# Patient Record
Sex: FEMALE | Race: WHITE | Employment: UNEMPLOYED | URBAN - METROPOLITAN AREA
[De-identification: names, ages, dates, MRNs, and addresses within clinical notes are randomized per-mention and may not be internally consistent; named-entity substitution may affect disease eponyms.]

---

## 2021-04-12 ENCOUNTER — TRANSCRIBE ORDER (OUTPATIENT)
Dept: SCHEDULING | Age: 71
End: 2021-04-12

## 2021-04-12 DIAGNOSIS — R20.1 HYPOESTHESIA: ICD-10-CM

## 2021-04-12 DIAGNOSIS — M54.32 LEFT SIDED SCIATICA: Primary | ICD-10-CM

## 2021-04-12 DIAGNOSIS — M47.816 LUMBAR SPONDYLOSIS: ICD-10-CM

## 2021-04-22 ENCOUNTER — HOSPITAL ENCOUNTER (OUTPATIENT)
Dept: MRI IMAGING | Age: 71
Discharge: HOME | End: 2021-04-22
Payer: MEDICARE

## 2021-04-22 DIAGNOSIS — M47.816 LUMBAR SPONDYLOSIS: ICD-10-CM

## 2021-04-22 DIAGNOSIS — R20.1 HYPOESTHESIA: ICD-10-CM

## 2021-04-22 DIAGNOSIS — M54.32 LEFT SIDED SCIATICA: ICD-10-CM

## 2021-04-22 PROCEDURE — 72148 MRI LUMBAR SPINE W/O DYE: CPT | Performed by: PHYSICAL MEDICINE & REHABILITATION

## 2025-06-09 ENCOUNTER — OFFICE VISIT (OUTPATIENT)
Age: 75
End: 2025-06-09
Payer: MEDICARE

## 2025-06-09 VITALS
SYSTOLIC BLOOD PRESSURE: 122 MMHG | OXYGEN SATURATION: 97 % | BODY MASS INDEX: 21.97 KG/M2 | HEIGHT: 67 IN | WEIGHT: 140 LBS | HEART RATE: 88 BPM | DIASTOLIC BLOOD PRESSURE: 60 MMHG

## 2025-06-09 DIAGNOSIS — R41.3 MEMORY CHANGES: Primary | ICD-10-CM

## 2025-06-09 DIAGNOSIS — F03.B0 MODERATE DEMENTIA WITHOUT BEHAVIORAL DISTURBANCE, PSYCHOTIC DISTURBANCE, MOOD DISTURBANCE, OR ANXIETY, UNSPECIFIED DEMENTIA TYPE (HCC): Primary | ICD-10-CM

## 2025-06-09 PROCEDURE — 1126F AMNT PAIN NOTED NONE PRSNT: CPT | Performed by: PSYCHIATRY & NEUROLOGY

## 2025-06-09 PROCEDURE — 3017F COLORECTAL CA SCREEN DOC REV: CPT | Performed by: PSYCHIATRY & NEUROLOGY

## 2025-06-09 PROCEDURE — 99204 OFFICE O/P NEW MOD 45 MIN: CPT | Performed by: PSYCHIATRY & NEUROLOGY

## 2025-06-09 PROCEDURE — 96132 NRPSYC TST EVAL PHYS/QHP 1ST: CPT | Performed by: PSYCHIATRY & NEUROLOGY

## 2025-06-09 PROCEDURE — 96136 PSYCL/NRPSYC TST PHY/QHP 1ST: CPT | Performed by: PSYCHIATRY & NEUROLOGY

## 2025-06-09 PROCEDURE — G8400 PT W/DXA NO RESULTS DOC: HCPCS | Performed by: PSYCHIATRY & NEUROLOGY

## 2025-06-09 PROCEDURE — 4004F PT TOBACCO SCREEN RCVD TLK: CPT | Performed by: PSYCHIATRY & NEUROLOGY

## 2025-06-09 PROCEDURE — 1090F PRES/ABSN URINE INCON ASSESS: CPT | Performed by: PSYCHIATRY & NEUROLOGY

## 2025-06-09 PROCEDURE — 1123F ACP DISCUSS/DSCN MKR DOCD: CPT | Performed by: PSYCHIATRY & NEUROLOGY

## 2025-06-09 PROCEDURE — 1159F MED LIST DOCD IN RCRD: CPT | Performed by: PSYCHIATRY & NEUROLOGY

## 2025-06-09 PROCEDURE — G8427 DOCREV CUR MEDS BY ELIG CLIN: HCPCS | Performed by: PSYCHIATRY & NEUROLOGY

## 2025-06-09 PROCEDURE — G8420 CALC BMI NORM PARAMETERS: HCPCS | Performed by: PSYCHIATRY & NEUROLOGY

## 2025-06-09 RX ORDER — PRAVASTATIN SODIUM 20 MG
20 TABLET ORAL DAILY
COMMUNITY
Start: 2025-04-17

## 2025-06-09 RX ORDER — PREDNISONE 5 MG/1
TABLET ORAL
COMMUNITY
Start: 2025-03-23

## 2025-06-09 RX ORDER — IRBESARTAN AND HYDROCHLOROTHIAZIDE 150; 12.5 MG/1; MG/1
1 TABLET, FILM COATED ORAL DAILY
COMMUNITY
Start: 2025-04-17

## 2025-06-09 NOTE — PROCEDURES
Neurocognitive Test Administration    Patient Information:  DANIEL GONG  1950  Female  This 74 year old female was administered a battery of neurocognitive testing on 06/09/2025.    Reason for Testing:  Memory changes    Test Administration Time:  The time spent administering cognitive testing was 16 minutes including setting the patient up, creating the order, discussing cognitive testing, answering questions, administering the test / active testing time, ensuring best practices (i.e. no distractions), and uploading the results. This cognitive testing was administered by a technician.    Neurocognitive Test Interpretation    Reason for Testing:  Memory changes    Tests Administered:  Trails A, Trails B, Stroop, Digit Symbol Substitution, Immediate Recognition, Delayed Recognition    Test Results:  Cognitive testing was provided via a battery of cognitive assessments. The pattern of test scores indicate that results are valid.    A Clinical Report with further description of scores and results is also available.    Overall: Patient tested in the 1st percentile (scaled standard score of 60).    Trails A: Patient tested in the 62nd percentile (scaled standard score of 104).  Trails B: Unavailable*.  Stroop: Patient tested in the 75th percentile (scaled standard score of 110).  Digit Symbol Substitution: Patient tested in the 8th percentile (scaled standard score of 79).  Immediate Recognition: Patient tested in the 1st percentile (scaled standard score of 45).  Delayed Recognition: Patient tested in the 1st percentile (scaled standard score of 20).    * These were not scored as they were potentially invalid, or the patient failed to complete in allotted time.    Interpretation of Test Scores:  Examination of individual component tests shows:  Attention - Trails A: Unlikely Impairment  Mental Flexibility - Trails B: Possible Impairment  Executive Function - Stroop: Unlikely Impairment  Processing Speed - Digit

## 2025-06-09 NOTE — PROGRESS NOTES
NEUROLOGY  NEW PATIENT EVALUATION/CONSULTATION       PATIENT NAME: Lorri Garcia    MRN: [id number]    REASON FOR CONSULTATION: Memory impairment    06/09/25      Previous records (physician notes, laboratory reports, and radiology reports) and imaging studies were reviewed and summarized. My recommendations will be communicated back to the patient's physician(s) via electronic medical record and/or by US mail. The patient was accompanied by her .      HISTORY OF PRESENT ILLNESS:  Lorri Garcia is a 74 y.o.  female presenting for evaluation of memory impairment.      Onset and progression: Worse over the past 2 years but reports some degree of memory impairment \"all my life\"    Neuropsychiatric symptoms    By Family members account:    Problems with judgment:No   Reduced interest in hobbies/activities: No   Repeats questions, stories, or statements: Yes    Trouble recalling people's names: No   Trouble learning how to use a tool or appliance: No   Forgetting the correct month or year: Yes   Difficulty handling financial affairs (bill-paying, taxes): Yes   Difficulty remembering appointments: No    Memory: Short term recall per pt's   Language:No word finding difficulty   Change in personality: None  Socially inappropriate behavior:None  Change in eating habits:None  Physical changes:None  Depressive symptoms:None  Hallucinations/Delusions:None    Ability to function:  Driving:Yes, no difficulties reported  Finances: Manages this independently with minor difficulties  Manages own medication: Managed by patient, no difficulties reported   Residing: At home with her , single family home    Prior work-up: None    Prior treatments: None      PAST MEDICAL HISTORY:  Past Medical History:   Diagnosis Date    H/O hypercholesterolemia     Hypertension     Memory disorder        PAST SURGICAL HISTORY:  History reviewed. No pertinent surgical history.    FAMILY HISTORY:   Family History   Problem

## 2025-06-10 LAB
TSH SERPL DL<=0.005 MIU/L-ACNC: 1.06 UIU/ML (ref 0.45–4.5)
VIT B12 SERPL-MCNC: 248 PG/ML (ref 232–1245)

## 2025-06-11 ENCOUNTER — RESULTS FOLLOW-UP (OUTPATIENT)
Age: 75
End: 2025-06-11

## 2025-06-11 NOTE — TELEPHONE ENCOUNTER
----- Message from Dr. Rocio Mckeon DO sent at 6/10/2025 11:13 AM EDT -----  B12 levels are borderline low normal range-advise starting B12 supplement 500mcg/daily. RMD  ----- Message -----  From: Kemi Beck Incoming Amb Ref Lab Orders To [facility]  Sent: 6/10/2025   7:36 AM EDT  To: Rocio Mckeon DO

## 2025-06-12 ENCOUNTER — TELEPHONE (OUTPATIENT)
Age: 75
End: 2025-06-12

## 2025-06-12 NOTE — TELEPHONE ENCOUNTER
Patient called in needing assistance signing up for [employer].    PSR sent her a new activation link and advised her to call back should she have any issues.

## 2025-06-12 NOTE — TELEPHONE ENCOUNTER
Called patient. BC was done during the appt. Might of been a mistake. Stated to ignore it please.  
Patient would like a call back as she states she cannot access the link for a brain check that the nurse sent her.  
Negative

## 2025-06-13 ENCOUNTER — TELEPHONE (OUTPATIENT)
Age: 75
End: 2025-06-13

## 2025-06-13 NOTE — TELEPHONE ENCOUNTER
Patient would like to confirm what medications Dr. Mckeon would like her to take until she sees her again in January.    Patient states she believes it is b-12.    Please call back and advise.

## 2025-07-21 ENCOUNTER — TELEPHONE (OUTPATIENT)
Age: 75
End: 2025-07-21

## 2025-07-21 NOTE — TELEPHONE ENCOUNTER
Pt  requesting call back to discuss pt condition and how to cope until she is able to be evaluated by neuropsych

## 2025-07-22 NOTE — TELEPHONE ENCOUNTER
Returned the call to the Pt's  however had to leave a voice mail asking him to return the call or send a message via my chart. Whichever is easiest for him.        Pt  requesting call back to discuss pt condition and how to cope until she is able to be evaluated by neuropsych

## 2025-07-31 NOTE — TELEPHONE ENCOUNTER
Spouse requesting phone call, inquiring if Dr. Mckeon had any information to help with cooping until her next appt. States sometimes he find his patients running thin and needs all the help he can get.  Spouse is also requesting to leave details of wife's appts with Dr. Ospina on .

## 2025-08-01 NOTE — TELEPHONE ENCOUNTER
Returned the call and spoke with the Pt's . Gave him the [employer] phone number to call for support group information and information in general about dementia.  He asked to be on the wait list for sooner appt. For Neuro Psych testing.